# Patient Record
Sex: MALE | Race: WHITE | URBAN - METROPOLITAN AREA
[De-identification: names, ages, dates, MRNs, and addresses within clinical notes are randomized per-mention and may not be internally consistent; named-entity substitution may affect disease eponyms.]

---

## 2022-08-24 ENCOUNTER — WEB ENCOUNTER (OUTPATIENT)
Dept: URBAN - METROPOLITAN AREA CLINIC 88 | Facility: CLINIC | Age: 57
End: 2022-08-24

## 2022-08-24 ENCOUNTER — LAB OUTSIDE AN ENCOUNTER (OUTPATIENT)
Dept: URBAN - METROPOLITAN AREA CLINIC 88 | Facility: CLINIC | Age: 57
End: 2022-08-24

## 2022-08-24 ENCOUNTER — OFFICE VISIT (OUTPATIENT)
Dept: URBAN - METROPOLITAN AREA CLINIC 88 | Facility: CLINIC | Age: 57
End: 2022-08-24
Payer: MEDICARE

## 2022-08-24 VITALS
WEIGHT: 170 LBS | SYSTOLIC BLOOD PRESSURE: 121 MMHG | DIASTOLIC BLOOD PRESSURE: 83 MMHG | HEIGHT: 72 IN | BODY MASS INDEX: 23.03 KG/M2 | TEMPERATURE: 97.8 F | HEART RATE: 100 BPM

## 2022-08-24 DIAGNOSIS — R13.19 ESOPHAGEAL DYSPHAGIA: ICD-10-CM

## 2022-08-24 DIAGNOSIS — K59.00 COLONIC CONSTIPATION: ICD-10-CM

## 2022-08-24 DIAGNOSIS — G82.50 QUADRIPLEGIA: ICD-10-CM

## 2022-08-24 DIAGNOSIS — K21.9 GASTROESOPHAGEAL REFLUX DISEASE, UNSPECIFIED WHETHER ESOPHAGITIS PRESENT: ICD-10-CM

## 2022-08-24 PROBLEM — 11538006: Status: ACTIVE | Noted: 2022-08-24

## 2022-08-24 PROBLEM — 235595009: Status: ACTIVE | Noted: 2022-08-24

## 2022-08-24 PROBLEM — 35298007: Status: ACTIVE | Noted: 2022-08-24

## 2022-08-24 PROCEDURE — 99204 OFFICE O/P NEW MOD 45 MIN: CPT | Performed by: NURSE PRACTITIONER

## 2022-08-24 RX ORDER — ALBUTEROL SULFATE 2.5 MG/3ML
3 ML AS NEEDED SOLUTION RESPIRATORY (INHALATION)
Status: ACTIVE | COMMUNITY

## 2022-08-24 RX ORDER — POTASSIUM CHLORIDE 750 MG/1
1 TABLET WITH FOOD TABLET, EXTENDED RELEASE ORAL TWICE A DAY
Status: ACTIVE | COMMUNITY

## 2022-08-24 RX ORDER — FOLIC ACID 1 MG/1
1 TABLET TABLET ORAL ONCE A DAY
Status: ACTIVE | COMMUNITY

## 2022-08-24 RX ORDER — OMEPRAZOLE 40 MG/1
1 CAPSULE 30 MINUTES BEFORE MORNING MEAL CAPSULE, DELAYED RELEASE ORAL ONCE A DAY
Status: ACTIVE | COMMUNITY

## 2022-08-24 RX ORDER — FUROSEMIDE 40 MG/1
1 TABLET TABLET ORAL ONCE A DAY
Status: ACTIVE | COMMUNITY

## 2022-08-24 RX ORDER — PANTOPRAZOLE SODIUM 40 MG/1
1 TABLET TABLET, DELAYED RELEASE ORAL
Qty: 90 | Refills: 1 | OUTPATIENT
Start: 2022-08-24

## 2022-08-24 RX ORDER — OXYBUTYNIN CHLORIDE 10 MG/1
1 TABLET TABLET, EXTENDED RELEASE ORAL ONCE A DAY
Status: ACTIVE | COMMUNITY

## 2022-08-24 RX ORDER — ESCITALOPRAM OXALATE 20 MG/1
1 TABLET TABLET ORAL ONCE A DAY
Status: ACTIVE | COMMUNITY

## 2022-08-24 RX ORDER — MAGNESIUM OXIDE 400 MG/1
1 TABLET AS NEEDED TABLET ORAL ONCE A DAY
Status: ACTIVE | COMMUNITY

## 2022-08-24 RX ORDER — BACLOFEN 20 MG/1
1 TABLET ADMINISTER WITHOUT REGARDS TO MEALS AS NEEDED TABLET ORAL TWICE A DAY
Status: ACTIVE | COMMUNITY

## 2022-08-24 RX ORDER — SPIRONOLACTONE 25 MG/1
1 TABLET TABLET, FILM COATED ORAL
Status: ACTIVE | COMMUNITY

## 2022-08-24 RX ORDER — ZOLPIDEM TARTRATE 10 MG/1
1 TABLET AT BEDTIME AS NEEDED TABLET, FILM COATED ORAL ONCE A DAY
Status: ACTIVE | COMMUNITY

## 2022-08-24 RX ORDER — FERROUS SULFATE 325(65) MG
1 TABLET TABLET ORAL ONCE A DAY
Status: ACTIVE | COMMUNITY

## 2022-08-24 RX ORDER — DABIGATRAN ETEXILATE MESYLATE 150 MG/1
1 CAPSULE CAPSULE ORAL TWICE A DAY
Status: ACTIVE | COMMUNITY

## 2022-08-24 RX ORDER — OMEPRAZOLE 40 MG/1
1 CAPSULE 30 MINUTES BEFORE MORNING MEAL CAPSULE, DELAYED RELEASE ORAL ONCE A DAY
OUTPATIENT

## 2022-08-24 RX ORDER — METOPROLOL TARTRATE 50 MG/1
1 TABLET WITH FOOD TABLET, FILM COATED ORAL TWICE A DAY
Status: ACTIVE | COMMUNITY

## 2022-08-24 RX ORDER — LORATADINE 10 MG
1 TABLET TABLET ORAL ONCE A DAY
Status: ACTIVE | COMMUNITY

## 2022-08-24 NOTE — PHYSICAL EXAM CONSTITUTIONAL:
well developed, well nourished , in no acute distress , ambulating with motorized wheelchair, normal communication ability

## 2022-08-24 NOTE — HPI-TODAY'S VISIT:
Patient presents today for evaluation of suspected GERD.  Patient is a quadriplegia since November 1982 from [facility].  Initially experienced daily c/o dysphagia to solids and liquids that started 3 months ago.  This sensation lasted for minutes to hours before gradually resolving.  Denies pain with swallowing, having loss of weight or appetite.   Associated symptoms:  increase bloating, flatulence.  Alleviating factors:  TUMs, Rolaids and starting omeprazole 20 mg daily.  Aggravating factors:  none.   Defecation occurs every other day after using suppositories.  Experiences a complete evacuation with defecation.   Admitted to [facility] for 6 days earlier this month for pneumonia.   Feels his dysphagia has become less since taking antibiotics for pneumonia.  Has also noticed less bloating and flatulence.  Caregiver states he has been diagnosed with  pneumonia about every 6 months.  Last evaluation by speech was in September 2021 to r/o aspiration pneumonia.  Regular diet with thin liquids recommended.  He feels dysphagia is resolved at present.  PCP started patient on omperazole over 2 months ago but does not feel medication is providing much relief as expected.    Colonoscopy and EGD were over 20 years ago with normal findings.

## 2022-09-19 ENCOUNTER — DASHBOARD ENCOUNTERS (OUTPATIENT)
Age: 57
End: 2022-09-19

## 2022-09-21 ENCOUNTER — OFFICE VISIT (OUTPATIENT)
Dept: URBAN - METROPOLITAN AREA CLINIC 88 | Facility: CLINIC | Age: 57
End: 2022-09-21

## 2022-09-21 RX ORDER — DABIGATRAN ETEXILATE MESYLATE 150 MG/1
1 CAPSULE CAPSULE ORAL TWICE A DAY
Status: ACTIVE | COMMUNITY

## 2022-09-21 RX ORDER — POTASSIUM CHLORIDE 750 MG/1
1 TABLET WITH FOOD TABLET, EXTENDED RELEASE ORAL TWICE A DAY
Status: ACTIVE | COMMUNITY

## 2022-09-21 RX ORDER — ESCITALOPRAM OXALATE 20 MG/1
1 TABLET TABLET ORAL ONCE A DAY
Status: ACTIVE | COMMUNITY

## 2022-09-21 RX ORDER — METOPROLOL TARTRATE 50 MG/1
1 TABLET WITH FOOD TABLET, FILM COATED ORAL TWICE A DAY
Status: ACTIVE | COMMUNITY

## 2022-09-21 RX ORDER — PANTOPRAZOLE SODIUM 40 MG/1
1 TABLET TABLET, DELAYED RELEASE ORAL
Qty: 90 | Refills: 1 | Status: ACTIVE | COMMUNITY
Start: 2022-08-24

## 2022-09-21 RX ORDER — FOLIC ACID 1 MG/1
1 TABLET TABLET ORAL ONCE A DAY
Status: ACTIVE | COMMUNITY

## 2022-09-21 RX ORDER — LORATADINE 10 MG
1 TABLET TABLET ORAL ONCE A DAY
Status: ACTIVE | COMMUNITY

## 2022-09-21 RX ORDER — ZOLPIDEM TARTRATE 10 MG/1
1 TABLET AT BEDTIME AS NEEDED TABLET, FILM COATED ORAL ONCE A DAY
Status: ACTIVE | COMMUNITY

## 2022-09-21 RX ORDER — OXYBUTYNIN CHLORIDE 10 MG/1
1 TABLET TABLET, EXTENDED RELEASE ORAL ONCE A DAY
Status: ACTIVE | COMMUNITY

## 2022-09-21 RX ORDER — SPIRONOLACTONE 25 MG/1
1 TABLET TABLET, FILM COATED ORAL
Status: ACTIVE | COMMUNITY

## 2022-09-21 RX ORDER — ALBUTEROL SULFATE 2.5 MG/3ML
3 ML AS NEEDED SOLUTION RESPIRATORY (INHALATION)
Status: ACTIVE | COMMUNITY

## 2022-09-21 RX ORDER — FUROSEMIDE 40 MG/1
1 TABLET TABLET ORAL ONCE A DAY
Status: ACTIVE | COMMUNITY

## 2022-09-21 RX ORDER — FERROUS SULFATE 325(65) MG
1 TABLET TABLET ORAL ONCE A DAY
Status: ACTIVE | COMMUNITY

## 2022-09-21 RX ORDER — MAGNESIUM OXIDE 400 MG/1
1 TABLET AS NEEDED TABLET ORAL ONCE A DAY
Status: ACTIVE | COMMUNITY

## 2022-09-21 RX ORDER — BACLOFEN 20 MG/1
1 TABLET ADMINISTER WITHOUT REGARDS TO MEALS AS NEEDED TABLET ORAL TWICE A DAY
Status: ACTIVE | COMMUNITY

## 2023-02-21 ENCOUNTER — TELEPHONE ENCOUNTER (OUTPATIENT)
Dept: URBAN - METROPOLITAN AREA CLINIC 70 | Facility: CLINIC | Age: 58
End: 2023-02-21

## 2023-02-21 RX ORDER — PANTOPRAZOLE SODIUM 40 MG/1
1 TABLET TABLET, DELAYED RELEASE ORAL
Qty: 90 | Refills: 1
Start: 2022-08-24